# Patient Record
Sex: FEMALE | Race: WHITE | NOT HISPANIC OR LATINO | Employment: OTHER | URBAN - METROPOLITAN AREA
[De-identification: names, ages, dates, MRNs, and addresses within clinical notes are randomized per-mention and may not be internally consistent; named-entity substitution may affect disease eponyms.]

---

## 2017-04-11 ENCOUNTER — TRANSFERRED RECORDS (OUTPATIENT)
Dept: HEALTH INFORMATION MANAGEMENT | Facility: CLINIC | Age: 62
End: 2017-04-11

## 2017-04-14 ENCOUNTER — TRANSFERRED RECORDS (OUTPATIENT)
Dept: HEALTH INFORMATION MANAGEMENT | Facility: CLINIC | Age: 62
End: 2017-04-14

## 2017-06-16 DIAGNOSIS — R31.29 MICROSCOPIC HEMATURIA: Primary | ICD-10-CM

## 2017-06-19 ENCOUNTER — OFFICE VISIT (OUTPATIENT)
Dept: UROLOGY | Facility: CLINIC | Age: 62
End: 2017-06-19
Payer: COMMERCIAL

## 2017-06-19 VITALS — WEIGHT: 133 LBS | HEIGHT: 67 IN | BODY MASS INDEX: 20.88 KG/M2

## 2017-06-19 DIAGNOSIS — R31.29 MICROSCOPIC HEMATURIA: ICD-10-CM

## 2017-06-19 LAB
ALBUMIN UR-MCNC: NEGATIVE MG/DL
APPEARANCE UR: CLEAR
BILIRUB UR QL STRIP: NEGATIVE
COLOR UR AUTO: YELLOW
GLUCOSE UR STRIP-MCNC: NEGATIVE MG/DL
HGB UR QL STRIP: ABNORMAL
KETONES UR STRIP-MCNC: NEGATIVE MG/DL
LEUKOCYTE ESTERASE UR QL STRIP: NEGATIVE
NITRATE UR QL: NEGATIVE
PH UR STRIP: 5.5 PH (ref 5–7)
SP GR UR STRIP: 1.02 (ref 1–1.03)
URN SPEC COLLECT METH UR: ABNORMAL
UROBILINOGEN UR STRIP-ACNC: 0.2 EU/DL (ref 0.2–1)

## 2017-06-19 PROCEDURE — 81003 URINALYSIS AUTO W/O SCOPE: CPT | Performed by: UROLOGY

## 2017-06-19 PROCEDURE — 99203 OFFICE O/P NEW LOW 30 MIN: CPT | Performed by: UROLOGY

## 2017-06-19 RX ORDER — ESTRADIOL/NORETHINDRONE ACETATE TRANSDERMAL SYSTEM .05; .25 MG/D; MG/D
PATCH, EXTENDED RELEASE TRANSDERMAL
Refills: 4 | COMMUNITY
Start: 2017-04-05

## 2017-06-19 ASSESSMENT — PAIN SCALES - GENERAL: PAINLEVEL: NO PAIN (0)

## 2017-06-19 NOTE — NURSING NOTE
Chief Complaint   Patient presents with     Hematuria     Patient is here for microhematuria. Patient said that she was told she had stones years ago but isnt sure if she passed them.     Michael Kovacs LPN 2:54 PM June 19, 2017

## 2017-06-19 NOTE — PROGRESS NOTES
History: This very pleasant 62-year-old lady is seeing me today because of reported microscopic hematuria although on closer questioning he thinks he may have had some red discoloration of the urine about a year ago.  She has had no pain, no history of urinary tract infection and no other remarkable symptoms.  She was investigated for microscopic hematuria about 8 years ago with a possibility of a stone in the left ureter but no other measurements were taken at that time.  She has no pain at this time.  She is otherwise in good health.  She had a history of smoking    Past Medical History:   Diagnosis Date     Breast mass WEEK AGO    LEFT      Complication of anesthesia     nausea     Migraine      Mumps     as a child     Need for prophylactic hormone replacement therapy (postmenopausal) STARTED 4 YRS AGO     Spider veins        Past Surgical History:   Procedure Laterality Date     COLONOSCOPY      hx colonic polyps     COLONOSCOPY  1/17/2013    Procedure: COLONOSCOPY;  COLONOSCOPY;  Surgeon: Segun Boyce MD;  Location:  GI     CYSTOSCOPY  2011     TONSILLECTOMY         Family History   Problem Relation Age of Onset     CANCER Mother      questionable stomach?     CANCER Father      brain cancer       Social History     Social History     Marital status:      Spouse name: N/A     Number of children: N/A     Years of education: N/A     Occupational History     Not on file.     Social History Main Topics     Smoking status: Current Every Day Smoker     Packs/day: 0.20     Types: Cigarettes     Smokeless tobacco: Not on file     Alcohol use Yes      Comment: 1/month     Drug use: Not on file     Sexual activity: Not on file     Other Topics Concern     Not on file     Social History Narrative       Current Outpatient Prescriptions   Medication Sig Dispense Refill     COMBIPATCH 0.05-0.25 MG/DAY BIW patch APPLY 1 PATCH TO ABDOMEN TWICE WEEKLY  4     Aspirin-Acetaminophen-Caffeine (EXCEDRIN MIGRAINE  "PO) Take  by mouth.       Acetaminophen (TYLENOL PO) Take  by mouth.         Review Of Systems:  Skin: negative  Eyes: negative  Ears/Nose/Throat: negative  Respiratory: Smoking history  Cardiovascular: negative  Gastrointestinal: negative  Genitourinary: hematuria  Musculoskeletal: negative  Neurologic: negative  Psychiatric: negative  Hematologic/Lymphatic/Immunologic: negative  Endocrine: negative    Exam:  Ht 1.689 m (5' 6.5\")  Wt 60.3 kg (133 lb)  Breastfeeding? No  BMI 21.15 kg/m2    General Impression: Very pleasant lady in no acute distress, well-oriented in time place and person.    Mental status.  Normal    HEENT: There is no evidence of jaundice and mucous membranes are normal    Skin: Skin is normal to examination    Lymph Nodes: Not examined    Respiratory System: The respiratory cycle is normal    Cardiovascular: Not examined    Abdominal: Not examined    Extremities: Not examined    Back and Flank: There is no flank tenderness    Genital: Not examined    Rectal: Not examined    Neurologic:  There are no focal abnormal neurologic signs and central, or peripheralsystems    Impression: The patient has a history of microscopic hematuria possibly gross hematuria on one occasion in the past.  This was investigated in 2009 with the only possible finding being a 3 mm stone in the left distal ureter.  Surgery was not performed at that time.  She is otherwise in good health.  She does have a significant smoking history however.  I would recommend we do a CT scan of the abdomen and pelvis with and without contrast.  We will also arrange for cystoscopy.  I did discuss the entire situation very carefully with the patient today.  I answered all her questions    Plan: Cystoscopy.  CT scan abdomen and pelvis with and without contrast, urine cytology    Time: 30 minutes.  Greater than 50% spent in discussion and consultation    \"This dictation was performed with voice recognition software and may contain errors,  " "omissions and inadvertent word substitution.\"    "

## 2017-06-19 NOTE — MR AVS SNAPSHOT
After Visit Summary   6/19/2017    Kristine Alvarado    MRN: [id number]           Patient Information     Date Of Birth          1955        Visit Information        Provider Department      6/19/2017 2:40 PM Luis Armando Goldstein MD [facility] Urology [facility]        Today's Diagnoses     Microscopic hematuria           Follow-ups after your visit        Follow-up notes from your care team     Return for cystoscopy, SEE ME AFTER, CT Abdo/Pelvis with/without, cytology.      Your next 10 appointments already scheduled     Jul 10, 2017  9:00 AM CDT   Cystoscopy with Luis Armando Goldstein MD,  CYF   [facility]rology [facility] ([facility])    [address]  [address]  [facility] [address] [phone]              Future tests that were ordered for you today     Open Future Orders        Priority Expected Expires Ordered    CT Abdomen Pelvis w/o & w Contrast [MIB700] Routine  6/19/2018 6/19/2017            Who to contact     If you have questions or need follow up information about today's clinic visit or your schedule please contact [facility] UROLOGY [facility] directly at [phone].  Normal or non-critical lab and imaging results will be communicated to you by MyChart, letter or phone within 4 business days after the clinic has received the results. If you do not hear from us within 7 days, please contact the clinic through [employer] or phone. If you have a critical or abnormal lab result, we will notify you by phone as soon as possible.  Submit refill requests through [employer] or call your pharmacy and they will forward the refill request to us. Please allow 3 business days for your refill to be completed.          Additional Information About Your Visit        MyChart Information     [employer] lets you send messages to your doctor, view your test results, renew your prescriptions, schedule appointments and  "more. To sign up, go to [email or web address] . Click on \"Log in\" on the left side of the screen, which will take you to the Welcome page. Then click on \"Sign up Now\" on the right side of the page.     You will be asked to enter the access code listed below, as well as some personal information. Please follow the directions to create your username and password.     Your access code is: [email or web address]  Expires: 2017  3:11 PM     Your access code will  in 90 days. If you need help or a new code, please call your [place] clinic or [phone].        Care EveryWhere ID     This is your Care EveryWhere ID. This could be used by other organizations to access your [place] medical records  [id number]        Your Vitals Were     Height Breastfeeding? BMI (Body Mass Index)             1.689 m (5' 6.5\") No 21.15 kg/m2          Blood Pressure from Last 3 Encounters:   13 109/77    Weight from Last 3 Encounters:   17 60.3 kg (133 lb)   13 65.8 kg (145 lb)              We Performed the Following     UA without Microscopic [[id number]]        Primary Care Provider Office Phone # Fax #    [facility] [phone]       [address] [place] [address], [address]  [facility] [address]        Thank you!     Thank you for choosing [facility]OLOG[facility] [place]  for your care. Our goal is always to provide you with excellent care. Hearing back from our patients is one way we can continue to improve our services. Please take a few minutes to complete the written survey that you may receive in the mail after your visit with us. Thank you!             Your Updated Medication List - Protect others around you: Learn how to safely use, store and throw away your medicines at www.disposemymeds.org.          This list is accurate as of: 17  3:21 PM.  Always use your most recent med list.                   Brand Name Dispense Instructions for use    COMBIPATCH " 0.05-0.25 MG/DAY BIW patch   Generic drug:  estradiol-norethindrone      APPLY 1 PATCH TO ABDOMEN TWICE WEEKLY       EXCEDRIN MIGRAINE PO      Take  by mouth.       TYLENOL PO      Take  by mouth.

## 2017-06-19 NOTE — LETTER
6/19/2017       RE: Kristine Alvarado  [address] [place] [address]     Dear Colleague,    Thank you for referring your patient, Kristine Alvarado, to the [facility] [place] [facility]. Please see a copy of my visit note below.    History: This very pleasant 62-year-old lady is seeing me today because of reported microscopic hematuria although on closer questioning he thinks he may have had some red discoloration of the urine about a year ago.  She has had no pain, no history of urinary tract infection and no other remarkable symptoms.  She was investigated for microscopic hematuria about 8 years ago with a possibility of a stone in the left ureter but no other measurements were taken at that time.  She has no pain at this time.  She is otherwise in good health.  She had a history of smoking    Past Medical History:   Diagnosis Date     Breast mass WEEK AGO    LEFT      Complication of anesthesia     nausea     Migraine      Mumps     as a child     Need for prophylactic hormone replacement therapy (postmenopausal) STARTED 4 YRS AGO     Spider veins        Past Surgical History:   Procedure Laterality Date     COLONOSCOPY      hx colonic polyps     COLONOSCOPY  1/17/2013    Procedure: COLONOSCOPY;  COLONOSCOPY;  Surgeon: Segun Boyce MD;  Location:  GI     CYSTOSCOPY  2011     TONSILLECTOMY         Family History   Problem Relation Age of Onset     CANCER Mother      questionable stomach?     CANCER Father      brain cancer       Social History     Social History     Marital status:      Spouse name: N/A     Number of children: N/A     Years of education: N/A     Occupational History     Not on file.     Social History Main Topics     Smoking status: Current Every Day Smoker     Packs/day: 0.20     Types: Cigarettes     Smokeless tobacco: Not on file     Alcohol use Yes      Comment: 1/month     Drug use: Not on file      "Sexual activity: Not on file     Other Topics Concern     Not on file     Social History Narrative       Current Outpatient Prescriptions   Medication Sig Dispense Refill     COMBIPATCH 0.05-0.25 MG/DAY BIW patch APPLY 1 PATCH TO ABDOMEN TWICE WEEKLY  4     Aspirin-Acetaminophen-Caffeine (EXCEDRIN MIGRAINE PO) Take  by mouth.       Acetaminophen (TYLENOL PO) Take  by mouth.         Review Of Systems:  Skin: negative  Eyes: negative  Ears/Nose/Throat: negative  Respiratory: Smoking history  Cardiovascular: negative  Gastrointestinal: negative  Genitourinary: hematuria  Musculoskeletal: negative  Neurologic: negative  Psychiatric: negative  Hematologic/Lymphatic/Immunologic: negative  Endocrine: negative    Exam:  Ht 1.689 m (5' 6.5\")  Wt 60.3 kg (133 lb)  Breastfeeding? No  BMI 21.15 kg/m2    General Impression: Very pleasant lady in no acute distress, well-oriented in time place and person.    Mental status.  Normal    HEENT: There is no evidence of jaundice and mucous membranes are normal    Skin: Skin is normal to examination    Lymph Nodes: Not examined    Respiratory System: The respiratory cycle is normal    Cardiovascular: Not examined    Abdominal: Not examined    Extremities: Not examined    Back and Flank: There is no flank tenderness    Genital: Not examined    Rectal: Not examined    Neurologic:  There are no focal abnormal neurologic signs and central, or peripheralsystems    Impression: The patient has a history of microscopic hematuria possibly gross hematuria on one occasion in the past.  This was investigated in 2009 with the only possible finding being a 3 mm stone in the left distal ureter.  Surgery was not performed at that time.  She is otherwise in good health.  She does have a significant smoking history however.  I would recommend we do a CT scan of the abdomen and pelvis with and without contrast.  We will also arrange for cystoscopy.  I did discuss the entire situation very carefully with " "the patient today.  I answered all her questions    Plan: Cystoscopy.  CT scan abdomen and pelvis with and without contrast, urine cytology    Time: 30 minutes.  Greater than 50% spent in discussion and consultation    \"This dictation was performed with voice recognition software and may contain errors,  omissions and inadvertent word substitution.\"      Again, thank you for allowing me to participate in the care of your patient.      Sincerely,    Luis Armando Goldstein MD      "

## 2017-07-07 DIAGNOSIS — R31.29 MICROHEMATURIA: Primary | ICD-10-CM

## 2017-07-10 ENCOUNTER — OFFICE VISIT (OUTPATIENT)
Dept: UROLOGY | Facility: CLINIC | Age: 62
End: 2017-07-10
Payer: COMMERCIAL

## 2017-07-10 VITALS
BODY MASS INDEX: 20.88 KG/M2 | WEIGHT: 133 LBS | HEART RATE: 80 BPM | DIASTOLIC BLOOD PRESSURE: 66 MMHG | HEIGHT: 67 IN | SYSTOLIC BLOOD PRESSURE: 110 MMHG

## 2017-07-10 DIAGNOSIS — N20.0 CALCULUS OF KIDNEY: Primary | ICD-10-CM

## 2017-07-10 DIAGNOSIS — R31.29 MICROHEMATURIA: ICD-10-CM

## 2017-07-10 LAB
ALBUMIN UR-MCNC: NEGATIVE MG/DL
APPEARANCE UR: CLEAR
BILIRUB UR QL STRIP: NEGATIVE
COLOR UR AUTO: YELLOW
GLUCOSE UR STRIP-MCNC: NEGATIVE MG/DL
HGB UR QL STRIP: ABNORMAL
KETONES UR STRIP-MCNC: NEGATIVE MG/DL
LEUKOCYTE ESTERASE UR QL STRIP: NEGATIVE
NITRATE UR QL: NEGATIVE
PH UR STRIP: 5.5 PH (ref 5–7)
SP GR UR STRIP: <=1.005 (ref 1–1.03)
URN SPEC COLLECT METH UR: ABNORMAL
UROBILINOGEN UR STRIP-ACNC: 0.2 EU/DL (ref 0.2–1)

## 2017-07-10 PROCEDURE — 81003 URINALYSIS AUTO W/O SCOPE: CPT | Performed by: UROLOGY

## 2017-07-10 PROCEDURE — 52000 CYSTOURETHROSCOPY: CPT | Performed by: UROLOGY

## 2017-07-10 PROCEDURE — 99213 OFFICE O/P EST LOW 20 MIN: CPT | Mod: 25 | Performed by: UROLOGY

## 2017-07-10 RX ORDER — CIPROFLOXACIN 500 MG/1
500 TABLET, FILM COATED ORAL 2 TIMES DAILY
Qty: 1 TABLET | Refills: 0 | Status: ON HOLD | OUTPATIENT
Start: 2017-07-10 | End: 2018-07-31

## 2017-07-10 ASSESSMENT — PAIN SCALES - GENERAL: PAINLEVEL: NO PAIN (0)

## 2017-07-10 NOTE — NURSING NOTE
Chief Complaint   Patient presents with     Cystoscopy     Microhematuria     Prior to the start of the procedure and with procedural staff participation, I verbally confirmed the patient s identity using two indicators, relevant allergies, that the procedure was appropriate and matched the consent or emergent situation, and that the correct equipment/implants were available. Immediately prior to starting the procedure I conducted the Time Out with the procedural staff and re-confirmed the patient s name, procedure, and site/side. (The Joint Commission universal protocol was followed.)  Yes    Sedation (Moderate or Deep): None    Elsa Rodriguez LPN

## 2017-07-10 NOTE — MR AVS SNAPSHOT
After Visit Summary   7/10/2017    Kristine Alvarado    MRN: [id number]           Patient Information     Date Of Birth          1955        Visit Information        Provider Department      7/10/2017 9:00 AM Luis Armando Goldstein MD;  [facility] [place]        Today's Diagnoses     Calculus of kidney    -  1    Microhematuria           Follow-ups after your visit        Follow-up notes from your care team     Return in about 6 months (around 1/10/2018) for KUB, Physical Exam.      Your next 10 appointments already scheduled     Feb 20, 2018 12:30 PM CST   XR KUB with SHXR3   [facility] Radiology ([facility])    [address] [facility] [id number] [phone]           Please bring a list of your current medicines to your exam. (Include vitamins, minerals and over-thecounter medicines.) Leave your valuables at home.  Tell your doctor if there is a chance you may be pregnant.  You do not need to do anything special for this exam.            Feb 20, 2018  1:30 PM CST   Return Visit with Luis Armando Goldstein MD   [facility] Urology [facility] ([facility] [place])    [address]  [address]  [facility] [id number] [phone]              Future tests that were ordered for you today     Open Future Orders        Priority Expected Expires Ordered    XR KUB [[id number]] Routine 7/10/2017 7/10/2018 7/10/2017            Who to contact     If you have questions or need follow up information about today's clinic visit or your schedule please contact [facility] UROLOGY [facility] directly at [phone].  Normal or non-critical lab and imaging results will be communicated to you by MyChart, letter or phone within 4 business days after the clinic has received the results. If you do not hear from us within 7 days, please contact the clinic through MyChart or phone. If you have a  "critical or abnormal lab result, we will notify you by phone as soon as possible.  Submit refill requests through "[employer]" or call your pharmacy and they will forward the refill request to us. Please allow 3 business days for your refill to be completed.          Additional Information About Your Visit        [employer] Information     "[employer] lets you send messages to your doctor, view your test results, renew your prescriptions, schedule appointments and more. To sign up, go to [email or web address]" . Click on \"Log in\" on the left side of the screen, which will take you to the Welcome page. Then click on \"Sign up Now\" on the right side of the page.     You will be asked to enter the access code listed below, as well as some personal information. Please follow the directions to create your username and password.     Your access code is: [email or web address]  Expires: 2017  3:11 PM     Your access code will  in 90 days. If you need help or a new code, please call your [place] clinic or [phone].        Care EveryWhere ID     This is your Care EveryWhere ID. This could be used by other organizations to access your [place] medical records  [id number]        Your Vitals Were     Pulse Height BMI (Body Mass Index)             80 1.689 m (5' 6.5\") 21.15 kg/m2          Blood Pressure from Last 3 Encounters:   07/10/17 110/66   13 109/77    Weight from Last 3 Encounters:   07/10/17 60.3 kg (133 lb)   17 60.3 kg (133 lb)   13 65.8 kg (145 lb)              We Performed the Following     UA without Microscopic          Today's Medication Changes          These changes are accurate as of: 7/10/17  9:51 AM.  If you have any questions, ask your nurse or doctor.               Start taking these medicines.        Dose/Directions    ciprofloxacin 500 MG tablet   Commonly known as:  CIPRO   Used for:  Calculus of kidney, Microhematuria   Started by:  Luis Armando Goldstein MD        Dose:  500 mg   Take 1 " tablet (500 mg) by mouth 2 times daily   Quantity:  1 tablet   Refills:  0            Where to get your medicines      These medications were sent to [facility] [place] - [place], [place] - [address]  [address], [place] [address]     Phone:  [phone]     ciprofloxacin 500 MG tablet                Primary Care Provider Office Phone # Fax #    [facility] [phone]       [facility], [address]  [place] [address]        Equal Access to Services     [facility]: Hadii aad ku hadasho Soomaali, waaxda luqadaha, qaybta kaalmada adeegyada, waxay layne toro . So [facility] [phone].    ATENCIÓN: Si habla español, tiene a albarran disposición servicios gratuitos de asistencia lingüística. Llame al [phone].    We comply with applicable federal civil rights laws and [place] laws. We do not discriminate on the basis of race, color, national origin, age, disability sex, sexual orientation or gender identity.            Thank you!     Thank you for choosing [facility] UROLOGY CLINIC [place]  for your care. Our goal is always to provide you with excellent care. Hearing back from our patients is one way we can continue to improve our services. Please take a few minutes to complete the written survey that you may receive in the mail after your visit with us. Thank you!             Your Updated Medication List - Protect others around you: Learn how to safely use, store and throw away your medicines at www.disposemymeds.org.          This list is accurate as of: 7/10/17  9:51 AM.  Always use your most recent med list.                   Brand Name Dispense Instructions for use Diagnosis    ciprofloxacin 500 MG tablet    CIPRO    1 tablet    Take 1 tablet (500 mg) by mouth 2 times daily    Calculus of kidney, Microhematuria       COMBIPATCH 0.05-0.25 MG/DAY BIW patch   Generic drug:  estradiol-norethindrone      APPLY  1 PATCH TO ABDOMEN TWICE WEEKLY        EXCEDRIN MIGRAINE PO      Take  by mouth.        TYLENOL PO      Take  by mouth.

## 2017-07-10 NOTE — PROGRESS NOTES
This very pleasant 62-year-old lady returns today for cystoscopy..  We recall that she had recently been found to have microscopic hematuria with no other significant symptoms.  The CT scan, which I personally reviewed today does show a 1 cm calculus in the lower pole calyx of the left kidney and a 2 mm stone in the lower calyx of the right kidney.  There is no evidence of hydronephrosis or significant renal masses.  There are no other remarkable features of note other than what appears to be a fibroid uterus.    Procedure.  Cystoscopy.  Surgeon.. Angelita.  Anesthesia.  Local anesthesia.  Description.  With the patient in the dorsal lithotomy position, and with the genital area prepped and draped in the customary fashion, a flexible cystoscope was carefully passed into the urethra.  The urethra is unremarkable.  The interior the bladder is carefully inspected.  There is no evidence of neoplasm or stone in the bladder.  There is no evidence of trabeculation.  The ureteral openings are in normal position and there are no other remarkable features.    Impression.  I have reviewed and discussed the entire situation very carefully with the patient today.  I suspect that the microscopic hematuria may well be caused by the 1 cm stone in the left kidney.  This is not causing any significant symptoms of clinical nature at this time.  I reviewed the KUB from 2010 and note that there was no evidence of a stone that could be seen on the KUB at that time so either the stone has grown in the last 7 years, orifices appear uric acid stone.  I did discuss potential treatment options with the patient including conservative management but with repeating the KUB in about 6 months to see if the stone A, can be seen, and B, to see if it actually got larger.  I have advised her that the stone could conceivably movement causes severe symptoms in which case we would need to see her promptly or she would need to go straight to the emergency  "room.  We did talk about the potential treatment options and it would be likely that ESWL would be a primary treatment option for stone this size although the stone dropped into the ureter with may need to consider ureteroscopy with treatment with holmium laser.  I did discuss the entire situation with her in detail today.  We have decided that we will repeat the KUB in 6 months time to determine if there is a growth rate.  If there are severe symptoms prior to that timeout procedure immediately.  I also her questions.    Plan.  6 months for KUB and examination.  Time.  15 minutes was spent in addition to the cystoscopy in order to discuss the findings, the globe the CT scan, to talk about the management of kidney stones and to answer any pertinent questions related to this situation.    \"This dictation was performed with voice recognition software and may contain errors,  omissions and inadvertent word substitution.\"      "

## 2017-07-10 NOTE — LETTER
7/10/2017       RE: Kristine Alvarado  [address] [facility] [address]     Dear Colleague,    Thank you for referring your patient, Kristine Alvarado, to the [facility] [place] at [facility]. Please see a copy of my visit note below.    This very pleasant 62-year-old lady returns today for cystoscopy..  We recall that she had recently been found to have microscopic hematuria with no other significant symptoms.  The CT scan, which I personally reviewed today does show a 1 cm calculus in the lower pole calyx of the left kidney and a 2 mm stone in the lower calyx of the right kidney.  There is no evidence of hydronephrosis or significant renal masses.  There are no other remarkable features of note other than what appears to be a fibroid uterus.    Procedure.  Cystoscopy.  Surgeon.. Angelita.  Anesthesia.  Local anesthesia.  Description.  With the patient in the dorsal lithotomy position, and with the genital area prepped and draped in the customary fashion, a flexible cystoscope was carefully passed into the urethra.  The urethra is unremarkable.  The interior the bladder is carefully inspected.  There is no evidence of neoplasm or stone in the bladder.  There is no evidence of trabeculation.  The ureteral openings are in normal position and there are no other remarkable features.    Impression.  I have reviewed and discussed the entire situation very carefully with the patient today.  I suspect that the microscopic hematuria may well be caused by the 1 cm stone in the left kidney.  This is not causing any significant symptoms of clinical nature at this time.  I reviewed the KUB from 2010 and note that there was no evidence of a stone that could be seen on the KUB at that time so either the stone has grown in the last 7 years, orifices appear uric acid stone.  I did discuss potential treatment options with the patient including conservative  "management but with repeating the KUB in about 6 months to see if the stone A, can be seen, and B, to see if it actually got larger.  I have advised her that the stone could conceivably movement causes severe symptoms in which case we would need to see her promptly or she would need to go straight to the emergency room.  We did talk about the potential treatment options and it would be likely that ESWL would be a primary treatment option for stone this size although the stone dropped into the ureter with may need to consider ureteroscopy with treatment with holmium laser.  I did discuss the entire situation with her in detail today.  We have decided that we will repeat the KUB in 6 months time to determine if there is a growth rate.  If there are severe symptoms prior to that timeout procedure immediately.  I also her questions.    Plan.  6 months for KUB and examination.  Time.  15 minutes was spent in addition to the cystoscopy in order to discuss the findings, the globe the CT scan, to talk about the management of kidney stones and to answer any pertinent questions related to this situation.    \"This dictation was performed with voice recognition software and may contain errors,  omissions and inadvertent word substitution.\"    Again, thank you for allowing me to participate in the care of your patient.      Sincerely,    Luis Armando Goldstein MD      "

## 2017-09-16 ENCOUNTER — HEALTH MAINTENANCE LETTER (OUTPATIENT)
Age: 62
End: 2017-09-16

## 2017-09-27 ENCOUNTER — HOSPITAL ENCOUNTER (OUTPATIENT)
Dept: MAMMOGRAPHY | Facility: CLINIC | Age: 62
Discharge: HOME | End: 2017-09-27
Attending: OBSTETRICS & GYNECOLOGY | Admitting: OBSTETRICS & GYNECOLOGY
Payer: COMMERCIAL

## 2017-09-27 DIAGNOSIS — Z12.31 ENCOUNTER FOR SCREENING MAMMOGRAM FOR HIGH-RISK PATIENT: ICD-10-CM

## 2017-09-27 PROCEDURE — G0202 SCR MAMMO BI INCL CAD: HCPCS

## 2017-09-27 PROCEDURE — 77063 BREAST TOMOSYNTHESIS BI: CPT

## 2018-07-31 ENCOUNTER — HOSPITAL ENCOUNTER (OUTPATIENT)
Facility: CLINIC | Age: 63
Discharge: HOME | End: 2018-07-31
Attending: COLON & RECTAL SURGERY | Admitting: COLON & RECTAL SURGERY
Payer: COMMERCIAL

## 2018-07-31 ENCOUNTER — SURGERY (OUTPATIENT)
Age: 63
End: 2018-07-31

## 2018-07-31 VITALS
WEIGHT: 140 LBS | RESPIRATION RATE: 24 BRPM | HEIGHT: 66 IN | DIASTOLIC BLOOD PRESSURE: 69 MMHG | BODY MASS INDEX: 22.5 KG/M2 | OXYGEN SATURATION: 96 % | SYSTOLIC BLOOD PRESSURE: 102 MMHG

## 2018-07-31 LAB — COLONOSCOPY: NORMAL

## 2018-07-31 PROCEDURE — 45378 DIAGNOSTIC COLONOSCOPY: CPT | Performed by: COLON & RECTAL SURGERY

## 2018-07-31 PROCEDURE — G0500 MOD SEDAT ENDO SERVICE >5YRS: HCPCS | Performed by: COLON & RECTAL SURGERY

## 2018-07-31 PROCEDURE — 25000128 H RX IP 250 OP 636: Performed by: COLON & RECTAL SURGERY

## 2018-07-31 PROCEDURE — G0121 COLON CA SCRN NOT HI RSK IND: HCPCS | Performed by: COLON & RECTAL SURGERY

## 2018-07-31 RX ORDER — ONDANSETRON 4 MG/1
4 TABLET, ORALLY DISINTEGRATING ORAL EVERY 6 HOURS PRN
Status: CANCELLED | OUTPATIENT
Start: 2018-07-31

## 2018-07-31 RX ORDER — ONDANSETRON 2 MG/ML
4 INJECTION INTRAMUSCULAR; INTRAVENOUS EVERY 6 HOURS PRN
Status: CANCELLED | OUTPATIENT
Start: 2018-07-31

## 2018-07-31 RX ORDER — NALOXONE HYDROCHLORIDE 0.4 MG/ML
.1-.4 INJECTION, SOLUTION INTRAMUSCULAR; INTRAVENOUS; SUBCUTANEOUS
Status: CANCELLED | OUTPATIENT
Start: 2018-07-31 | End: 2018-08-01

## 2018-07-31 RX ORDER — ONDANSETRON 2 MG/ML
4 INJECTION INTRAMUSCULAR; INTRAVENOUS
Status: DISCONTINUED | OUTPATIENT
Start: 2018-07-31 | End: 2018-07-31 | Stop reason: HOSPADM

## 2018-07-31 RX ORDER — FLUMAZENIL 0.1 MG/ML
0.2 INJECTION, SOLUTION INTRAVENOUS
Status: CANCELLED | OUTPATIENT
Start: 2018-07-31 | End: 2018-08-01

## 2018-07-31 RX ORDER — LIDOCAINE 40 MG/G
CREAM TOPICAL
Status: DISCONTINUED | OUTPATIENT
Start: 2018-07-31 | End: 2018-07-31 | Stop reason: HOSPADM

## 2018-07-31 RX ORDER — FENTANYL CITRATE 50 UG/ML
INJECTION, SOLUTION INTRAMUSCULAR; INTRAVENOUS PRN
Status: DISCONTINUED | OUTPATIENT
Start: 2018-07-31 | End: 2018-07-31 | Stop reason: HOSPADM

## 2018-07-31 RX ADMIN — FENTANYL CITRATE 100 MCG: 50 INJECTION, SOLUTION INTRAMUSCULAR; INTRAVENOUS at 14:12

## 2018-07-31 RX ADMIN — MIDAZOLAM 2 MG: 1 INJECTION INTRAMUSCULAR; INTRAVENOUS at 14:13

## 2018-07-31 NOTE — H&P
[facility]    History and Physical  Colon and Rectal Surgery     Date of Admission:  7/31/2018      Assessment & Plan   Kristine Alvarado is a 63 year old female who presents for colonoscopy.    Indication: history of colon polyps  Plan for Colonoscopy with possible biopsy, possible polypectomy. We discussed the risks, benefits and alternatives and the patient wished to proceed.    The above has been forwarded to the consulting provider.      Segun Boyce MD  [facility]  [phone]        Code Status   Full Code    Primary Care Physician   Mima Buckner      History is obtained from the patient    History of Present Illness   Kristine Alvarado is a 63 year old female who presents with a history of colon polyps    Past Medical History    I have reviewed this patient's medical history and updated it with pertinent information if needed.   Past Medical History:   Diagnosis Date     Breast mass WEEK AGO    LEFT      Complication of anesthesia     nausea     Migraine      Mumps     as a child     Need for prophylactic hormone replacement therapy (postmenopausal) STARTED 4 YRS AGO     Spider veins        Past Surgical History   I have reviewed this patient's surgical history and updated it with pertinent information if needed.  Past Surgical History:   Procedure Laterality Date     COLONOSCOPY      hx colonic polyps     COLONOSCOPY  1/17/2013    Procedure: COLONOSCOPY;  COLONOSCOPY;  Surgeon: Segun Boyce MD;  Location:  GI     CYSTOSCOPY  2011     TONSILLECTOMY         Prior to Admission Medications   Prior to Admission Medications   Prescriptions Last Dose Informant Patient Reported? Taking?   Acetaminophen (TYLENOL PO) More than a month  Yes No   Sig: Take  by mouth.   Aspirin-Acetaminophen-Caffeine (EXCEDRIN MIGRAINE PO) Past Week  Yes Yes   Sig: Take  by mouth.   COMBIPATCH 0.05-0.25 MG/DAY BIW patch 7/31/2018  Yes Yes   Sig: APPLY 1 PATCH TO  ABDOMEN TWICE WEEKLY   Lactobacillus (PROBIOTIC ACIDOPHILUS PO) Past Week  Yes Yes      Facility-Administered Medications: None     Allergies   Allergies   Allergen Reactions     Sulfa Drugs Rash       Social History   I have reviewed this patient's social history and updated it with pertinent information if needed. Kristine Alvarado  reports that she has been smoking Cigarettes.  She has been smoking about 0.20 packs per day. She has never used smokeless tobacco. She reports that she drinks alcohol.    Family History   I have reviewed this patient's family history and updated it with pertinent information if needed.   Family History   Problem Relation Age of Onset     Cancer Mother      questionable stomach?     Cancer Father      brain cancer     Breast Cancer Sister        Review of Systems   CONSTITUTIONAL: NEGATIVE for fever, chills, change in weight  ENT/MOUTH: NEGATIVE for ear, mouth and throat problems  RESP: NEGATIVE for significant cough or SOB  CV: NEGATIVE for chest pain, palpitations or peripheral edema    Physical Exam       BP: 107/80     Resp: 16 SpO2: 99 %      Vital Signs with Ranges  Resp:  [16] 16  BP: (107)/(80) 107/80  SpO2:  [99 %] 99 %  140 lbs 0 oz    Constitutional: awake, alert, cooperative, no apparent distress, and appears stated age  AIRWAY EXAM: Mallampatti Class I (visualization of the soft palate, fauces, uvula, anterior and posterior pillars)  Respiratory: No increased work of breathing, good air exchange, clear to auscultation bilaterally, no crackles or wheezing  Cardiovascular: Normal apical impulse, regular rate and rhythm, normal S1 and S2, no S3 or S4, and no murmur noted  ASA Class: 1 - Healthy patient, no medical problems

## 2018-07-31 NOTE — BRIEF OP NOTE
[facility] Brief Operative Note    Pre-operative diagnosis: HISTORY OF POLYPS   Post-operative diagnosis normal colonoscopy   Procedure: Procedure(s):  COLONOSCOPY - Wound Class: II-Clean Contaminated   Surgeon(s): Surgeon(s) and Role:     * Segun Boyce MD - Primary   Estimated blood loss: * No values recorded between 7/31/2018 12:00 AM and 7/31/2018  2:35 PM *    Specimens: * No specimens in log *   Findings: normal colonoscopy  See provation procedure note in chart review.    Segun Boyce MD  [facility]  [phone]

## 2018-08-02 ENCOUNTER — THERAPY VISIT (OUTPATIENT)
Dept: PHYSICAL THERAPY | Facility: CLINIC | Age: 63
End: 2018-08-02
Payer: COMMERCIAL

## 2018-08-02 DIAGNOSIS — M54.2 NECK PAIN: ICD-10-CM

## 2018-08-02 DIAGNOSIS — M25.511 RIGHT SHOULDER PAIN: Primary | ICD-10-CM

## 2018-08-02 PROCEDURE — 97112 NEUROMUSCULAR REEDUCATION: CPT | Mod: GP | Performed by: PHYSICAL THERAPIST

## 2018-08-02 PROCEDURE — 97161 PT EVAL LOW COMPLEX 20 MIN: CPT | Mod: GP | Performed by: PHYSICAL THERAPIST

## 2018-08-02 PROCEDURE — 97110 THERAPEUTIC EXERCISES: CPT | Mod: GP | Performed by: PHYSICAL THERAPIST

## 2018-08-02 NOTE — LETTER
[facility]  [address] [place] [address]  [address]  [facility] [id number] [phone]    August 3, 2018    Re: Kristine Alvarado   :   1955  MRN:  [id number]   REFERRING PHYSICIAN:   Mima Buckner    [facility]    Date of Initial Evaluation:  18  Visits:  Rxs Used: 1  Reason for Referral:     Right shoulder pain  Neck pain    EVALUATION SUMMARY    [facility] Initial Evaluation  Subjective:  Kristine Alvarado is a 63 year old female with a right shoulder condition. This is a new condition  Pt reports R shoulder RC since , pt tripped and she tried to catch herself when she started reporting pain in her shoulder. she had PT for it which helped a little, pt lived in [place] back then, she yajaira back to [place] when she had an MRI done for a neck and shoulder; MRI showed RC tear 75%, cortisone shot was given which helped a lot, she went back to [place] where she did pilates which helped significantly, MD recommended PT on 18      Patient reports pain:  In the joint and lateral.  Radiates to:  Cervical, upper arm and lower arm.  Pain is described as sharp and is constant and reported as 8/10.  Associated symptoms:  Loss of strength. Pain is worse during the day (wakes up once a night ).  Symptoms are exacerbated by lifting, lying on extremity, using arm behind back and using arm overhead and relieved by NSAID's.  Since onset symptoms are gradually improving.  Special tests:  MRI. General health as reported by patient is good.  Pertinent medical history includes:  Migraines/headaches, concussions/dizziness, and smoking (Pain at night/rest, numbness/tingling). Current medications:  Hormone replacement therapy.  Current occupation is Retried.  Primary job tasks include:  Prolonged sitting, lifting and driving (Computer work).  Medical allergies: yes (Sulfa).  Surgical  history: Sinus.             Objective:  Standing Alignment:    Shoulder/UE:  Rounded shoulders and scapular winging R              Re: Kristine Alvarado   :   1955        Cervical/Thoracic Evaluation  AROM:  AROM Cervical:  Flexion:          End range stretch   Extension:       80% ROM  Rotation:         Left: 70%      Right: 60%  Side Bend:      Left:     Right:   Headaches: migraine     Shoulder Evaluation:  ROM:  AROM:    Flexion:  Left:  170    Right:  135  Extension: Left: 60Right: 35  Abduction:  Left: 160   Right:  118  Internal Rotation:  Left:  T4    Right:  L 4  Horizontal Adduction:  Left:  Wnl    Right:  Max rest   Strength:    Flexion: Left:4+/5   Pain:    Right: 2+/5     Pain:   Extension:  Left: 5-/5    Pain:    Right: 2+/5    Pain:  Abduction:  Left: 4+/5  Pain:    Right: 2+/5      Pain:+  Internal Rotation:  Left:5/5     Pain:    Right: 4+/5     Pain:  External Rotation:   Left:5/5     Pain:   Right:3-/5     Pain:    Special Tests:    Right shoulder negative for the following special tests:Rotator cuff tear  Palpation:    Right shoulder tenderness present at: Supraspinatus; Infraspinatus; Teres Minor and Bicipital Groove   General   ROS    Assessment/Plan:    Patient is a 63 year old female with cervical and right side shoulder complaints.    Patient has the following significant findings with corresponding treatment plan.                Diagnosis 1:  Neck pain, R shoulder RC tear Pain -  hot/cold therapy, manual therapy, self management, education, directional preference exercise and home program  Decreased ROM/flexibility - manual therapy, therapeutic exercise and home program  Decreased joint mobility - manual therapy, therapeutic exercise and home program  Decreased strength - therapeutic exercise, therapeutic activities and home program  Impaired muscle performance - neuro re-education  Decreased function - therapeutic activities  Impaired posture - neuro re-education    Therapy  Evaluation Codes:   1) History comprised of:   Personal factors that impact the plan of care:      Time since onset of symptoms.    Comorbidity factors that impact the plan of care are:      Dizziness, Migraines/headaches, Numbness/tingling, Pain at night/rest and Smoking.     Medications impacting care: Anti-inflammatory.  Re: Kristine Alvarado   :   1955    2) Examination of Body Systems comprised of:   Body structures and functions that impact the plan of care:      Cervical spine and Shoulder.   Activity limitations that impact the plan of care are:      Dressing, Lifting, Reading/Computer work and Sitting.  3) Clinical presentation characteristics are:   Stable/Uncomplicated.  4) Decision-Making    Moderate complexity using standardized patient assessment instrument and/or measureable assessment of functional outcome.  Cumulative Therapy Evaluation is: Low complexity.    Communication ability:  Patient appears to be able to clearly communicate and understand verbal and written communication and follow directions correctly.  Treatment Explanation - The following has been discussed with the patient:   RX ordered/plan of care  Anticipated outcomes  Possible risks and side effects  This patient would benefit from PT intervention to resume normal activities.   Rehab potential is good.  Frequency:  1 X week, once daily  Duration:  for 8 weeks  Discharge Plan:  Achieve all LTG.  Independent in home treatment program.  Return to previous functional level by discharge.  Reach maximal therapeutic benefit.    Thank you for your referral.    INQUIRIES  Therapist: Kanchan Roque PT   [facility]  [address] [place] [address]  [address]  [facility] [address]  Phone: [phone]  Fax: [phone]

## 2018-08-02 NOTE — PROGRESS NOTES
[place] [facility] Initial Evaluation  Subjective:  Patient is a 63 year old female presenting with rehab right shoulder hpi.   Kristine Alvarado is a 63 year old female with a right shoulder condition.      This is a new condition  Pt reports R shoulder RC since 12/17, pt tripped and she tried to catch herself when she started reporting pain in her shoulder. she had PT for it which helped a little, pt lived in [place] back then, she yajaira back to [place] when she had an MRI done for a neck and shoulder; MRI showed RC tear 75%, cortisone shot was given which helped a lot, she went back to [place] where she did pilates which helped significantly, MD recommended PT on 7/27/18  .    Patient reports pain:  In the joint and lateral.  Radiates to:  Cervical, upper arm and lower arm.  Pain is described as sharp and is constant and reported as 8/10.  Associated symptoms:  Loss of strength. Pain is worse during the day (wakes up once a night ).  Symptoms are exacerbated by lifting, lying on extremity, using arm behind back and using arm overhead and relieved by NSAID's.  Since onset symptoms are gradually improving.  Special tests:  MRI.      General health as reported by patient is good.  Pertinent medical history includes:  Migraines/headaches.      Current medications:  Hormone replacement therapy.  Current occupation is Retried .    Primary job tasks include:  Prolonged sitting.                                Objective:  Standing Alignment:      Shoulder/UE:  Rounded shoulders and scapular winging R                                  Cervical/Thoracic Evaluation    AROM:  AROM Cervical:    Flexion:          End range stretch   Extension:       80% ROM  Rotation:         Left: 70%      Right: 60%  Side Bend:      Left:     Right:       Headaches: migraine                         Shoulder Evaluation:  ROM:  AROM:    Flexion:  Left:  170    Right:  135  Extension: Left: 60Right: 35  Abduction:  Left: 160   Right:   118    Internal Rotation:  Left:  T4    Right:  L 4      Horizontal Adduction:  Left:  Wnl    Right:  Max rest                   Strength:    Flexion: Left:4+/5   Pain:    Right: 2+/5     Pain:   Extension:  Left: 5-/5    Pain:    Right: 2+/5    Pain:  Abduction:  Left: 4+/5  Pain:    Right: 2+/5      Pain:+    Internal Rotation:  Left:5/5     Pain:    Right: 4+/5     Pain:  External Rotation:   Left:5/5     Pain:   Right:3-/5     Pain:              Special Tests:        Right shoulder negative for the following special tests:Rotator cuff tear  Palpation:      Right shoulder tenderness present at: Supraspinatus; Infraspinatus; Teres Minor and Bicipital Groove                                     General     ROS    Assessment/Plan:    Patient is a 63 year old female with cervical and right side shoulder complaints.    Patient has the following significant findings with corresponding treatment plan.                Diagnosis 1:  Neck pain, R shoulder RC tear Pain -  hot/cold therapy, manual therapy, self management, education, directional preference exercise and home program  Decreased ROM/flexibility - manual therapy, therapeutic exercise and home program  Decreased joint mobility - manual therapy, therapeutic exercise and home program  Decreased strength - therapeutic exercise, therapeutic activities and home program  Impaired muscle performance - neuro re-education  Decreased function - therapeutic activities  Impaired posture - neuro re-education    Therapy Evaluation Codes:   1) History comprised of:   Personal factors that impact the plan of care:      Time since onset of symptoms.    Comorbidity factors that impact the plan of care are:      Dizziness, Migraines/headaches, Numbness/tingling, Pain at night/rest and Smoking.     Medications impacting care: Anti-inflammatory.  2) Examination of Body Systems comprised of:   Body structures and functions that impact the plan of care:      Cervical spine and  Shoulder.   Activity limitations that impact the plan of care are:      Dressing, Lifting, Reading/Computer work and Sitting.  3) Clinical presentation characteristics are:   Stable/Uncomplicated.  4) Decision-Making    Moderate complexity using standardized patient assessment instrument and/or measureable assessment of functional outcome.  Cumulative Therapy Evaluation is: Low complexity.    Previous and current functional limitations:  (See Goal Flow Sheet for this information)    Short term and Long term goals: (See Goal Flow Sheet for this information)     Communication ability:  Patient appears to be able to clearly communicate and understand verbal and written communication and follow directions correctly.  Treatment Explanation - The following has been discussed with the patient:   RX ordered/plan of care  Anticipated outcomes  Possible risks and side effects  This patient would benefit from PT intervention to resume normal activities.   Rehab potential is good.    Frequency:  1 X week, once daily  Duration:  for 8 weeks  Discharge Plan:  Achieve all LTG.  Independent in home treatment program.  Return to previous functional level by discharge.  Reach maximal therapeutic benefit.    Please refer to the daily flowsheet for treatment today, total treatment time and time spent performing 1:1 timed codes.

## 2018-08-03 NOTE — PROGRESS NOTES
[place] [facility] Initial Evaluation  Subjective:  Patient is a 63 year old female presenting with rehab right shoulder hpi.                                      Pertinent medical history includes:  Concussions/dizziness, migraines/headaches and smoking (Pain at night/rest, numbness/tingling).  Medical allergies: yes (Sulfa).  Surgical history: Sinus.  Current medications:  Hormone replacement therapy.      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                                Objective:  System    Physical Exam    General     ROS    Assessment/Plan:

## 2018-08-09 ENCOUNTER — THERAPY VISIT (OUTPATIENT)
Dept: PHYSICAL THERAPY | Facility: CLINIC | Age: 63
End: 2018-08-09
Payer: COMMERCIAL

## 2018-08-09 DIAGNOSIS — M25.511 RIGHT SHOULDER PAIN: ICD-10-CM

## 2018-08-09 DIAGNOSIS — M54.2 NECK PAIN: ICD-10-CM

## 2018-08-09 PROCEDURE — 97140 MANUAL THERAPY 1/> REGIONS: CPT | Mod: GP | Performed by: PHYSICAL THERAPIST

## 2018-08-09 PROCEDURE — 97110 THERAPEUTIC EXERCISES: CPT | Mod: GP | Performed by: PHYSICAL THERAPIST

## 2018-08-16 ENCOUNTER — THERAPY VISIT (OUTPATIENT)
Dept: PHYSICAL THERAPY | Facility: CLINIC | Age: 63
End: 2018-08-16
Payer: COMMERCIAL

## 2018-08-16 DIAGNOSIS — M25.511 RIGHT SHOULDER PAIN: ICD-10-CM

## 2018-08-16 DIAGNOSIS — M54.2 NECK PAIN: ICD-10-CM

## 2018-08-16 PROCEDURE — 97112 NEUROMUSCULAR REEDUCATION: CPT | Mod: GP | Performed by: PHYSICAL THERAPIST

## 2018-08-16 PROCEDURE — 97110 THERAPEUTIC EXERCISES: CPT | Mod: GP | Performed by: PHYSICAL THERAPIST

## 2018-08-23 ENCOUNTER — THERAPY VISIT (OUTPATIENT)
Dept: PHYSICAL THERAPY | Facility: CLINIC | Age: 63
End: 2018-08-23
Payer: COMMERCIAL

## 2018-08-23 DIAGNOSIS — M54.2 NECK PAIN: ICD-10-CM

## 2018-08-23 DIAGNOSIS — M25.511 RIGHT SHOULDER PAIN: ICD-10-CM

## 2018-08-23 PROCEDURE — 97110 THERAPEUTIC EXERCISES: CPT | Mod: GP | Performed by: PHYSICAL THERAPIST

## 2018-08-23 PROCEDURE — 97112 NEUROMUSCULAR REEDUCATION: CPT | Mod: GP | Performed by: PHYSICAL THERAPIST

## 2018-09-06 ENCOUNTER — THERAPY VISIT (OUTPATIENT)
Dept: PHYSICAL THERAPY | Facility: CLINIC | Age: 63
End: 2018-09-06
Payer: COMMERCIAL

## 2018-09-06 DIAGNOSIS — M25.511 RIGHT SHOULDER PAIN: ICD-10-CM

## 2018-09-06 DIAGNOSIS — M54.2 NECK PAIN: ICD-10-CM

## 2018-09-06 PROCEDURE — 97112 NEUROMUSCULAR REEDUCATION: CPT | Mod: GP | Performed by: PHYSICAL THERAPIST

## 2018-09-06 PROCEDURE — 97110 THERAPEUTIC EXERCISES: CPT | Mod: GP | Performed by: PHYSICAL THERAPIST

## 2018-09-27 ENCOUNTER — THERAPY VISIT (OUTPATIENT)
Dept: PHYSICAL THERAPY | Facility: CLINIC | Age: 63
End: 2018-09-27
Payer: COMMERCIAL

## 2018-09-27 DIAGNOSIS — M54.2 NECK PAIN: ICD-10-CM

## 2018-09-27 DIAGNOSIS — M25.511 RIGHT SHOULDER PAIN: ICD-10-CM

## 2018-09-27 PROCEDURE — 97112 NEUROMUSCULAR REEDUCATION: CPT | Mod: GP | Performed by: PHYSICAL THERAPIST

## 2018-09-27 PROCEDURE — 97110 THERAPEUTIC EXERCISES: CPT | Mod: GP | Performed by: PHYSICAL THERAPIST

## 2018-09-27 NOTE — MR AVS SNAPSHOT
After Visit Summary   9/27/2018    Kristine Alvarado    MRN: [id number]           Patient Information     Date Of Birth          1955        Visit Information        Provider Department      9/27/2018 10:20 AM Amelia Robles PT [facility] [facility]        Today's Diagnoses     Right shoulder pain        Neck pain           Follow-ups after your visit        Your next 10 appointments already scheduled     Oct 04, 2018 10:20 AM CDT   PATT Extremity with Amelia Robles PT   [facility] ([facility])    [address]  [address]  [facility] [address] [phone]              Who to contact     If you have questions or need follow up information about today's clinic visit or your schedule please contact [place] [facility] directly at [phone].  Normal or non-critical lab and imaging results will be communicated to you by [employer], letter or phone within 4 business days after the clinic has received the results. If you do not hear from us within 7 days, please contact the clinic through [employer] or phone. If you have a critical or abnormal lab result, we will notify you by phone as soon as possible.  Submit refill requests through [employer] or call your pharmacy and they will forward the refill request to us. Please allow 3 business days for your refill to be completed.          Additional Information About Your Visit        MyChart Information     [employer] gives you secure access to your electronic health record. If you see a primary care provider, you can also send messages to your care team and make appointments. If you have questions, please call your primary care clinic.  If you do not have a primary care provider, please call [phone] and they will assist you.        Care EveryWhere ID     This is your Care EveryWhere ID.  This could be used by other organizations to access your [place] medical records  [id number]         Blood Pressure from Last 3 Encounters:   07/31/18 102/69   07/10/17 110/66   01/17/13 109/77    Weight from Last 3 Encounters:   07/31/18 63.5 kg (140 lb)   07/10/17 60.3 kg (133 lb)   06/19/17 60.3 kg (133 lb)              We Performed the Following     NEUROMUSCULAR RE-EDUCATION     THERAPEUTIC EXERCISES        Primary Care Provider Office Phone # Fax #    Mima Buckner PA-C [phone]       [facility] [facility] [address]  [place] [address]        Equal Access to Services     SID BHATT : Hadii aad ku hadasho Soomaali, waaxda luqadaha, qaybta kaalmada adeegyada, waxay layne toro . So [facility] [phone].    ATENCIÓN: Si habla español, tiene a albarran disposición servicios gratuitos de asistencia lingüística. Ll[facility] [phone].    We comply with applicable federal civil rights laws and [place] laws. We do not discriminate on the basis of race, color, national origin, age, disability, sex, sexual orientation, or gender identity.            Thank you!     Thank you for [facility]  for your care. Our goal is always to provide you with excellent care. Hearing back from our patients is one way we can continue to improve our services. Please take a few minutes to complete the written survey that you may receive in the mail after your visit with us. Thank you!             Your Updated Medication List - Protect others around you: Learn how to safely use, store and throw away your medicines at www.disposemymeds.org.          This list is accurate as of 9/27/18 12:08 PM.  Always use your most recent med list.                   Brand Name Dispense Instructions for use Diagnosis    COMBIPATCH 0.05-0.25 MG/DAY BIW patch   Generic drug:  estradiol-norethindrone      APPLY 1 PATCH TO ABDOMEN TWICE WEEKLY         EXCEDRIN MIGRAINE PO      Take  by mouth.        PROBIOTIC ACIDOPHILUS PO           TYLENOL PO      Take  by mouth.

## 2019-06-26 PROBLEM — M54.2 NECK PAIN: Status: RESOLVED | Noted: 2018-08-02 | Resolved: 2019-06-26

## 2019-06-26 PROBLEM — M25.511 RIGHT SHOULDER PAIN: Status: RESOLVED | Noted: 2018-08-02 | Resolved: 2019-06-26

## 2019-06-26 NOTE — PROGRESS NOTES
Subjective:  HPI                    Objective:  System    Physical Exam    General     ROS    Discharge Note    Progress reporting period is from last progress note on   to Sep 27, 2018.    Kristine failed to follow up and current status is unknown.  Please see information below for last relevant information on current status.  Patient seen for 6 visits.    SUBJECTIVE  Subjective changes noted by patient:  Vane states her pain is better this week, her ROM and function is better  .  Current pain level is 3/10.     Previous pain level was  8/10.   Changes in function:  Yes (See Goal flowsheet attached for changes in current functional level)  Adverse reaction to treatment or activity: None    OBJECTIVE  Changes noted in objective findings: Improved shoulder flx, ext, IR ROM- end range pain 2/10, Abd 2/10 at 90 degrees. Improved scapular stabilization demonstrated. Posture good without cues     ASSESSMENT/PLAN  Diagnosis: R RC tear, neck pain and stiffness; ?secondary adhesive capsulitis    Updated problem list and treatment plan:     STG/LTGs have been met or progress has been made towards goals:  Yes, please see goal flowsheet for most current information  Assessment of Progress: current status is unknown.    Last current status: Pt is progressing as expected   Self Management Plans:  HEP  I have re-evaluated this patient and find that the nature, scope, duration and intensity of the therapy is appropriate for the medical condition of the patient.  Kristine continues to require the following intervention to meet STG and LTG's:  HEP.    Recommendations:  Discharge with current home program.  Patient to follow up with MD as needed.    Please refer to the daily flowsheet for treatment today, total treatment time and time spent performing 1:1 timed codes.

## 2019-10-03 ENCOUNTER — HEALTH MAINTENANCE LETTER (OUTPATIENT)
Age: 64
End: 2019-10-03

## 2020-02-08 ENCOUNTER — HEALTH MAINTENANCE LETTER (OUTPATIENT)
Age: 65
End: 2020-02-08

## 2020-11-07 ENCOUNTER — HEALTH MAINTENANCE LETTER (OUTPATIENT)
Age: 65
End: 2020-11-07

## 2021-03-21 ENCOUNTER — HEALTH MAINTENANCE LETTER (OUTPATIENT)
Age: 66
End: 2021-03-21

## 2021-09-05 ENCOUNTER — HEALTH MAINTENANCE LETTER (OUTPATIENT)
Age: 66
End: 2021-09-05

## 2021-09-09 ENCOUNTER — TRANSFERRED RECORDS (OUTPATIENT)
Dept: HEALTH INFORMATION MANAGEMENT | Facility: CLINIC | Age: 66
End: 2021-09-09

## 2021-09-15 ENCOUNTER — TRANSFERRED RECORDS (OUTPATIENT)
Dept: HEALTH INFORMATION MANAGEMENT | Facility: CLINIC | Age: 66
End: 2021-09-15

## 2021-09-16 ENCOUNTER — TRANSFERRED RECORDS (OUTPATIENT)
Dept: HEALTH INFORMATION MANAGEMENT | Facility: CLINIC | Age: 66
End: 2021-09-16

## 2021-09-20 ENCOUNTER — TRANSFERRED RECORDS (OUTPATIENT)
Dept: HEALTH INFORMATION MANAGEMENT | Facility: CLINIC | Age: 66
End: 2021-09-20

## 2021-09-21 ENCOUNTER — TELEPHONE (OUTPATIENT)
Dept: MAMMOGRAPHY | Facility: CLINIC | Age: 66
End: 2021-09-21

## 2021-09-21 DIAGNOSIS — Z17.0 MALIGNANT NEOPLASM OF UPPER-OUTER QUADRANT OF LEFT BREAST IN FEMALE, ESTROGEN RECEPTOR POSITIVE (H): Primary | ICD-10-CM

## 2021-09-21 DIAGNOSIS — C50.412 MALIGNANT NEOPLASM OF UPPER-OUTER QUADRANT OF LEFT BREAST IN FEMALE, ESTROGEN RECEPTOR POSITIVE (H): Primary | ICD-10-CM

## 2021-09-21 NOTE — TELEPHONE ENCOUNTER
I called patient this afternoon who is scheduled for surgical consultation with Dr. Nydia Hannah on 9/24/21 @ 10:30a.m. at the [facility] [place] [facility] for newly diagnosed Left Breast ILC.      Patient has had her imaging and pathology/biopsy done at [facility].     We discussed her new diagnosis, her upcoming surgical consultation and I answered all of her questions completely to her satisfaction.    Informed patient I am waiting on more imaging and pathology reports and if I get that back I can call her to discuss the pathology results in more detail.      Also informed patient I will check with Dr. Hannah to see if she would like to order a bilateral breast MRI on this patient, and proceed with getting her scheduled.   I will call patient back on this as well.     Patient verbalized understanding and agrees with the plan of care.    Veena Perkins RN BSN  Breast Nurse Care Coordinator  [facility] [place] [facility] [place]  [phone]

## 2021-09-21 NOTE — TELEPHONE ENCOUNTER
Full pathology report received from [facility].  I called patient to explain pathology results.    Also informed Dr. Hannah would like to proceed with getting patient scheduled for a bilateral breast MRI.  I placed order and awaiting Dr. Hannah's signature and scheduling.  Informed patient to call me back with any questions or concerns.      Patient verbalized understanding and agrees with the plan of care.  Veena Perkins RN, BSN

## 2021-09-22 NOTE — TELEPHONE ENCOUNTER
Patient is now scheduled for bilateral breast MRI on 10/5/21 @ 7:30a.m. at the [facility].  Patient was given appointment details and directions from breast imaging scheduler.  Veena Perkins RN, BSN

## 2021-09-23 NOTE — PROGRESS NOTES
[facility] Surgery [facility] Consultation    CHIEF COMPLAINT:  Chief Complaint   Patient presents with     Consult     Left breast [facility]       HISTORY OF PRESENT ILLNESS:  Kristine Alvarado is a 66 year old female who is seen in consultation at the request of Dr. Noriega for evaluation of left breast invasive lobular carcinoma.  The patient was undergoing her annual screening mammogram in September 2021 when she was noted to have asymmetry and possible architectural distortion of the left upper breast, 7 cm from the nipple.  Spot compression views and left breast ultrasound were obtained.  The studies demonstrated 7 x 6 x 5 mm irregular hypoechoic mass at the 1 o'clock position of the left breast, 7 cm from the nipple.  The imaging results did not comment on the patient's left axilla.  The patient underwent ultrasound-guided biopsy and pathology demonstrated invasive lobular carcinoma, grade 2 of 3, associated LCIS but no evidence of angiolymphatic invasion.  ER/MO positive and HER-2 negative.  The patient does undergo annual screening mammograms.  Her breast density is documented as scattered fibroglandular.  The patient has not noticed any breast masses, breast pain, or nipple discharge bilaterally.  This was the patient's first breast biopsy.  Family history is significant for a sister who was diagnosed with ductal breast cancer at the age of 62.  The patient believes that her sister underwent genetic testing and that it was negative.  The patient also has a cousin with a history of breast cancer.  She is currently smoking and admits to approximately 5 cigarettes/day.  The patient has also been taking hormone replacement therapy but states that she took her patch off after she received her breast cancer diagnosis.  Patient began menarche around the age of 13.  She had her first child at age 33 and attempted to breast-feed but was unsuccessful.  She reports being on oral contraceptives for approximately 10 years.   Current bra size is 36D.      REVIEW OF SYSTEMS:  Constitutional: No fevers or chills  Eyes: No blurred or double vision  HENT: Denies headaches, No rhinorrhea, No sore throat  Respiratory: No cough or shortness of breath  Cardiovascular: Denies chest pain or palpitations  Gastrointestinal: No abdominal pain or nausea/vomiting  Genitourinary: No hematuria or dysuria  Musculoskeletal: Denies arthralgias or myalgias  Neurologic: No numbness or tingling  Integumentary: No skin rashes    Past Medical History:   Diagnosis Date     Breast mass WEEK AGO    LEFT      Complication of anesthesia     nausea     Migraine      Mumps     as a child     Need for prophylactic hormone replacement therapy (postmenopausal) STARTED 4 YRS AGO     Spider veins        Past Surgical History:   Procedure Laterality Date     COLONOSCOPY      hx colonic polyps     COLONOSCOPY  1/17/2013    Procedure: COLONOSCOPY;  COLONOSCOPY;  Surgeon: Segun Boyce MD;  Location:  GI     COLONOSCOPY N/A 7/31/2018    Procedure: COLONOSCOPY;  COLONOSCOPY;  Surgeon: Segun Boyce MD;  Location:  GI     CYSTOSCOPY  2011     TONSILLECTOMY         Family History   Problem Relation Age of Onset     Cancer Mother         questionable stomach?     Cancer Father         brain cancer     Breast Cancer Sister        Social History     Tobacco Use     Smoking status: Current Every Day Smoker     Packs/day: 0.20     Types: Cigarettes     Smokeless tobacco: Never Used   Substance Use Topics     Alcohol use: Yes     Comment: 1/month       Patient Active Problem List   Diagnosis   (none) - all problems resolved or deleted       Allergies   Allergen Reactions     Amoxicillin      Sulfa Drugs Rash       Current Outpatient Medications   Medication Sig Dispense Refill     Acetaminophen (TYLENOL PO) Take  by mouth.       Aspirin-Acetaminophen-Caffeine (EXCEDRIN MIGRAINE PO) Take  by mouth.       COMBIPATCH 0.05-0.25 MG/DAY BIW patch APPLY 1 PATCH TO  "ABDOMEN TWICE WEEKLY  4       Vitals: /60 (BP Location: Left arm, Patient Position: Sitting, Cuff Size: Adult Regular)   Pulse 84   Ht 1.676 m (5' 6\")   Wt 64.9 kg (143 lb)   SpO2 97%   BMI 23.08 kg/m    BMI= Body mass index is 23.08 kg/m .    EXAM:  General: Vital signs reviewed, in no apparent distress  Eyes: Anicteric  HENT: Normocephalic, atraumatic, trachea midline   Respiratory: Breathing nonlabored  Cardiovascular: Regular rate and rhythm  Breast: Ecchymoses over left lateral breast but no evidence of palpable breast masses bilaterally  Lymph: No axillary lymphadenopathy bilaterally  Musculoskeletal: No gross deformities  Neurologic: Grossly nonfocal exam  Psychiatric: Normal mood, affect and insight  Integumentary: Warm and dry    All labs and imaging personally reviewed and significant for:   -See HPI    ASSESSMENT:  Kristine Alvarado is a 66 year old who presents with left breast invasive lobular carcinoma.  Significant pertinent co-morbidities include: None.       PLAN:  A thorough discussion was had today in clinic with the patient and her  regarding her recent breast cancer diagnosis and surgical treatment options.  I recommend proceeding with bilateral breast MRI, due to the patient's diagnosis of lobular carcinoma.  The patient reports that she has an MRI previously scheduled for Tuesday of this coming week.  I will have our breast care navigator reach out to her to obtain the results of this study.  Today in clinic, we did discuss both lumpectomy and mastectomy (unilateral versus bilateral).  The patient understands that she would need to undergo sentinel lymph node biopsy as well.  I also discussed possible genetic consultation, due to the patient's family history.  I have also encouraged the patient to completely discontinue her hormone replacement therapy.  The patient is going to consider her surgical options and further management plan will be determined after the results of the " patient's breast MRI are available.    It was my pleasure to participate in the care of Kristine Alvarado in clinic today. Thank you for this consultation.         Nydia Hannah MD    Please route or send letter to:  Primary Care Provider (PCP) and Referring Provider

## 2021-09-24 ENCOUNTER — OFFICE VISIT (OUTPATIENT)
Dept: SURGERY | Facility: CLINIC | Age: 66
End: 2021-09-24
Payer: MEDICARE

## 2021-09-24 VITALS
BODY MASS INDEX: 22.98 KG/M2 | HEART RATE: 84 BPM | OXYGEN SATURATION: 97 % | DIASTOLIC BLOOD PRESSURE: 60 MMHG | HEIGHT: 66 IN | SYSTOLIC BLOOD PRESSURE: 104 MMHG | WEIGHT: 143 LBS

## 2021-09-24 DIAGNOSIS — C50.912 INVASIVE LOBULAR CARCINOMA OF LEFT BREAST IN FEMALE (H): Primary | ICD-10-CM

## 2021-09-24 PROCEDURE — 99204 OFFICE O/P NEW MOD 45 MIN: CPT | Performed by: SURGERY

## 2021-09-24 ASSESSMENT — MIFFLIN-ST. JEOR: SCORE: 1210.39

## 2021-09-24 NOTE — NURSING NOTE
Breast Patients    BREAST PATIENTS (ALL)    1-Do you have any of the following symptoms? Other: none  2-In which breast are you having the symptoms? left  3-Have you had a Mammogram? 9/8-9/2021  4-Have you ever had a breast cyst drained? No  5-Have you ever had a breast biopsy? Yes:  Left   -   Date:  9/9/2021  6-Have you ever had a Breast Cancer? No   7-Is there a history of Breast Cancer in your family? Yes   Relationship to you:    Sister  Cousin  8-Have you ever had Ovarian Cancer? No  9-Is there a history of Ovarian Cancer in your family? No  10-Summarize your caffeine intake (i.e. coffee, tea, chocolate, soda etc.): 2-3 a day of coffee    BREAST PATIENTS (FEMALE)    11-What age did your periods begin? Around 13  12-Date your last menstrual period began? 2008?  13-Number of full-term pregnancies: 2  14-Your age when your first child was born? 33  15-Did you nurse your children? No  16-Are you pregnant now? No  17-Have you begun menopause? Yes  Age Menopause began:  53  18-Have you had either ovary removed?No  19-Do you have breast implants? No   20-Do you use hormone replacement therapy?  Yes  Type: Combi patch  Dosage: 0.05-0.25 once a week  21-Have you taken oral contraceptive pills?    22-Have you had an intrauterine device (IUD) placed?  No  23-What is your current bra size?  36D

## 2021-09-24 NOTE — Clinical Note
Hey there!  Turns out, the patient has an MRI scheduled at [facility] on Tuesday.  She also has a consultation scheduled at [facility] next week but tells me that she is uncertain whether she plans to keep that appointment.  Can you please reach out to her on either Wednesday or Thursday of next week to obtain her MRI results?  We did also discuss the possibility of the patient doing her work-up here in [place] but then continuing with treatment in [place].  Just a heads up!  Thanks,  Nydia

## 2021-09-24 NOTE — LETTER
September 24, 2021          Monica Noriega MD  [facility]  [address] [facility] [address]  [place] [address]      RE:   Kristine Alvarado 1955      Dear Colleague,    Thank you for referring your patient, rKistine Alvarado, to [facility]. Please see a copy of my visit note below.    [facility] Consultation     CHIEF COMPLAINT:       Chief Complaint   Patient presents with     Consult       Left breast [facility]         HISTORY OF PRESENT ILLNESS:  Kristine Alvarado is a 66 year old female who is seen in consultation at the request of Dr. Noriega for evaluation of left breast invasive lobular carcinoma.  The patient was undergoing her annual screening mammogram in September 2021 when she was noted to have asymmetry and possible architectural distortion of the left upper breast, 7 cm from the nipple.  Spot compression views and left breast ultrasound were obtained.  The studies demonstrated 7 x 6 x 5 mm irregular hypoechoic mass at the 1 o'clock position of the left breast, 7 cm from the nipple.  The imaging results did not comment on the patient's left axilla.  The patient underwent ultrasound-guided biopsy and pathology demonstrated invasive lobular carcinoma, grade 2 of 3, associated LCIS but no evidence of angiolymphatic invasion.  ER/VA positive and HER-2 negative.  The patient does undergo annual screening mammograms.  Her breast density is documented as scattered fibroglandular.  The patient has not noticed any breast masses, breast pain, or nipple discharge bilaterally.  This was the patient's first breast biopsy.  Family history is significant for a sister who was diagnosed with ductal breast cancer at the age of 62.  The patient believes that her sister underwent genetic testing and that it was negative.  The patient also has a cousin with a history of breast cancer.  She is currently smoking and admits to approximately 5 cigarettes/day.  The patient has also been  taking hormone replacement therapy but states that she took her patch off after she received her breast cancer diagnosis.  Patient began menarche around the age of 13.  She had her first child at age 33 and attempted to breast-feed but was unsuccessful.  She reports being on oral contraceptives for approximately 10 years.  Current bra size is 36D.       REVIEW OF SYSTEMS:  Constitutional: No fevers or chills  Eyes: No blurred or double vision  HENT: Denies headaches, No rhinorrhea, No sore throat  Respiratory: No cough or shortness of breath  Cardiovascular: Denies chest pain or palpitations  Gastrointestinal: No abdominal pain or nausea/vomiting  Genitourinary: No hematuria or dysuria  Musculoskeletal: Denies arthralgias or myalgias  Neurologic: No numbness or tingling  Integumentary: No skin rashes     Past Medical History        Past Medical History:   Diagnosis Date     Breast mass WEEK AGO     LEFT      Complication of anesthesia       nausea     Migraine       Mumps       as a child     Need for prophylactic hormone replacement therapy (postmenopausal) STARTED 4 YRS AGO     Spider veins              Past Surgical History         Past Surgical History:   Procedure Laterality Date     COLONOSCOPY         hx colonic polyps     COLONOSCOPY   1/17/2013     Procedure: COLONOSCOPY;  COLONOSCOPY;  Surgeon: Segun Boyce MD;  Location:  GI     COLONOSCOPY N/A 7/31/2018     Procedure: COLONOSCOPY;  COLONOSCOPY;  Surgeon: Segun Boyce MD;  Location:  GI     CYSTOSCOPY   2011     TONSILLECTOMY                Family History         Family History   Problem Relation Age of Onset     Cancer Mother           questionable stomach?     Cancer Father           brain cancer     Breast Cancer Sister              Social History            Tobacco Use     Smoking status: Current Every Day Smoker       Packs/day: 0.20       Types: Cigarettes     Smokeless tobacco: Never Used   Substance Use Topics     Alcohol  "use: Yes       Comment: 1/month         Patient Active Problem List   Diagnosis   (none) - all problems resolved or deleted              Allergies   Allergen Reactions     Amoxicillin       Sulfa Drugs Rash         Current Outpatient Prescriptions          Current Outpatient Medications   Medication Sig Dispense Refill     Acetaminophen (TYLENOL PO) Take  by mouth.         Aspirin-Acetaminophen-Caffeine (EXCEDRIN MIGRAINE PO) Take  by mouth.         COMBIPATCH 0.05-0.25 MG/DAY BIW patch APPLY 1 PATCH TO ABDOMEN TWICE WEEKLY   4            Vitals: /60 (BP Location: Left arm, Patient Position: Sitting, Cuff Size: Adult Regular)   Pulse 84   Ht 1.676 m (5' 6\")   Wt 64.9 kg (143 lb)   SpO2 97%   BMI 23.08 kg/m    BMI= Body mass index is 23.08 kg/m .     EXAM:  General: Vital signs reviewed, in no apparent distress  Eyes: Anicteric  HENT: Normocephalic, atraumatic, trachea midline   Respiratory: Breathing nonlabored  Cardiovascular: Regular rate and rhythm  Breast: Ecchymoses over left lateral breast but no evidence of palpable breast masses bilaterally  Lymph: No axillary lymphadenopathy bilaterally  Musculoskeletal: No gross deformities  Neurologic: Grossly nonfocal exam  Psychiatric: Normal mood, affect and insight  Integumentary: Warm and dry     All labs and imaging personally reviewed and significant for:   -See HPI     ASSESSMENT:  Kristine Alvarado is a 66 year old who presents with left breast invasive lobular carcinoma.  Significant pertinent co-morbidities include: None.         PLAN:  A thorough discussion was had today in clinic with the patient and her  regarding her recent breast cancer diagnosis and surgical treatment options.  I recommend proceeding with bilateral breast MRI, due to the patient's diagnosis of lobular carcinoma.  The patient reports that she has an MRI previously scheduled for Tuesday of this coming week.  I will have our breast care navigator reach out to her to obtain " the results of this study.  Today in clinic, we did discuss both lumpectomy and mastectomy (unilateral versus bilateral).  The patient understands that she would need to undergo sentinel lymph node biopsy as well.  I also discussed possible genetic consultation, due to the patient's family history.  I have also encouraged the patient to completely discontinue her hormone replacement therapy.  The patient is going to consider her surgical options and further management plan will be determined after the results of the patient's breast MRI are available.           Again, thank you for allowing me to participate in the care of your patient.      Sincerely,      Nydia Hannah MD

## 2021-09-28 ENCOUNTER — TELEPHONE (OUTPATIENT)
Dept: MAMMOGRAPHY | Facility: CLINIC | Age: 66
End: 2021-09-28

## 2021-09-28 NOTE — TELEPHONE ENCOUNTER
Patient called me back this afternoon to inform she saw a surgeon at [facility] yesterday and is scheduled for a breast MRI today at [facility].      Patient states she will ask for [facility] to push her MRI images and reports to us.  She also states she does not need the MRI scheduled at [facility] on 10/5/21, and is okay if I go ahead and cancel it.    Vane plans on waiting until she gets the MRI results before she decides on which breast surgeon she would like to go with, and she will call me to inform.  Informed patient to call us back with any questions or concerns.  Patient verbalizes understanding and agrees with the plan of care.  Veena Perkins RN BSN  Breast Nurse Care Coordinator  [facility] [place] [place] [facility] [place]  [phone]

## 2021-09-28 NOTE — TELEPHONE ENCOUNTER
Breast Care Nurse Coordination:  Follow up call placed to patient today to assess her needs and check in as to whether she has any questions regarding her recent surgical consultation.  I called patient this morning and left a message asking if she could return my call when available.     Vane was recently diagnosed with Left breast cancer and had a surgical consultation with Dr. Nydia Hannah on 9/24/21.    Patient is scheduled for bilateral breast MRI on 10/5/21 @ 7:30a.m., at the [facility].    It was discussed at patient's visit with Dr. Hannah on 9/24/21 that she is also scheduled for consult with surgeon at [facility] this week as well as a breast MRI 9/28/21.  I am reaching out to patient to confirm she had MRI today, and then cancel MRI at [facility] on 10/5/21.   Awaiting a return call from patient.    Veena Perkins RN BSN  Breast Care Nurse Coordinator  [facility] [place]  [facility] [place]  [phone]

## 2021-09-30 NOTE — TELEPHONE ENCOUNTER
I called patient this afternoon and left her a voicemail message asking if she could return my call when available.    I am reaching out to patient to check in on how her Breast MRI went at [facility] earlier this week and if she has decided on which breast surgeon she would like to use.   Awaiting a return call from patient to discuss.      I will ask Anurag MANDEL - Imaging Data  if he could obtain images and Breast MRI report from [facility].  Veena Perkins, RN, BSN

## 2021-09-30 NOTE — TELEPHONE ENCOUNTER
Patient called back and left me a voicemail message stating she was notified of her breast MRI results this past Tuesday.    I spoke with Vane and she states the Breast MRI showed 2 spots on the right breast which require biopsies and she is now scheduled at [facility] on Monday, Oct. 4th.  Patient states she has not decided yet if she wants to seek her breast cancer treatment at [facility] or [facility].    I have requested for the films and reports to be sent to us for Dr. Hannah to review.  Informed patient to call me back with any questions or concerns, otherwise I will follow up with her next week.  Patient verbalized understanding and agrees with the plan of care.  Veena Perkins, RN, BSN

## 2021-10-12 NOTE — TELEPHONE ENCOUNTER
I called patient and left her a message asking if she could return my call when available.  Awaiting a return call with an update on patient's status.  Veena Perkins, RN, BSN

## 2021-10-13 NOTE — TELEPHONE ENCOUNTER
Patient called me back on 10/12/2021 @ 3:48p.m. and left me a voicemail message stating she is scheduled to have a bilateral mastectomy at [facility] [place] with Dr. Chayo Rodriguez on 10/27/21 and will move forward with having her breast cancer treatment at [facility].      Patient expressed gratitude for the care Dr. Hannah had given her at [place].  I will forward message on to Dr. Hannah to inform.  Veena Perkins RN, BSN

## 2021-10-19 ENCOUNTER — TRANSFERRED RECORDS (OUTPATIENT)
Dept: HEALTH INFORMATION MANAGEMENT | Facility: CLINIC | Age: 66
End: 2021-10-19
Payer: MEDICARE

## 2021-10-19 ENCOUNTER — MEDICAL CORRESPONDENCE (OUTPATIENT)
Dept: HEALTH INFORMATION MANAGEMENT | Facility: CLINIC | Age: 66
End: 2021-10-19
Payer: MEDICARE

## 2021-10-19 DIAGNOSIS — I45.10 RIGHT BUNDLE BRANCH BLOCK: Primary | ICD-10-CM

## 2021-10-21 ENCOUNTER — HOSPITAL ENCOUNTER (OUTPATIENT)
Dept: CARDIOLOGY | Facility: CLINIC | Age: 66
Discharge: HOME | End: 2021-10-21
Attending: PHYSICIAN ASSISTANT | Admitting: PHYSICIAN ASSISTANT
Payer: MEDICARE

## 2021-10-21 DIAGNOSIS — I45.10 RIGHT BUNDLE BRANCH BLOCK: ICD-10-CM

## 2021-10-21 PROCEDURE — 93016 CV STRESS TEST SUPVJ ONLY: CPT | Performed by: INTERNAL MEDICINE

## 2021-10-21 PROCEDURE — 93350 STRESS TTE ONLY: CPT | Mod: 26 | Performed by: INTERNAL MEDICINE

## 2021-10-21 PROCEDURE — 93018 CV STRESS TEST I&R ONLY: CPT | Performed by: INTERNAL MEDICINE

## 2021-10-21 PROCEDURE — 999N000208 ECHO STRESS ECHOCARDIOGRAM

## 2021-10-21 PROCEDURE — 93321 DOPPLER ECHO F-UP/LMTD STD: CPT | Mod: 26 | Performed by: INTERNAL MEDICINE

## 2021-10-21 PROCEDURE — 93325 DOPPLER ECHO COLOR FLOW MAPG: CPT | Mod: 26 | Performed by: INTERNAL MEDICINE

## 2021-10-21 PROCEDURE — C8928 TTE W OR W/O FOL W/CON,STRES: HCPCS

## 2022-04-17 ENCOUNTER — HEALTH MAINTENANCE LETTER (OUTPATIENT)
Age: 67
End: 2022-04-17

## 2022-08-22 ENCOUNTER — HOSPITAL ENCOUNTER (OUTPATIENT)
Dept: CT IMAGING | Facility: CLINIC | Age: 67
Discharge: HOME | End: 2022-08-22
Attending: PHYSICIAN ASSISTANT | Admitting: PHYSICIAN ASSISTANT
Payer: MEDICARE

## 2022-08-22 DIAGNOSIS — Z87.891 HISTORY OF TOBACCO USE: ICD-10-CM

## 2022-08-22 PROCEDURE — 71271 CT THORAX LUNG CANCER SCR C-: CPT

## 2022-10-23 ENCOUNTER — HEALTH MAINTENANCE LETTER (OUTPATIENT)
Age: 67
End: 2022-10-23

## 2023-06-01 ENCOUNTER — HEALTH MAINTENANCE LETTER (OUTPATIENT)
Age: 68
End: 2023-06-01

## 2023-09-07 ENCOUNTER — ANCILLARY PROCEDURE (OUTPATIENT)
Dept: CT IMAGING | Facility: CLINIC | Age: 68
End: 2023-09-07
Attending: PHYSICIAN ASSISTANT
Payer: MEDICARE

## 2023-09-07 DIAGNOSIS — Z87.891 HISTORY OF TOBACCO USE: ICD-10-CM

## 2023-09-07 PROCEDURE — 71271 CT THORAX LUNG CANCER SCR C-: CPT

## 2024-01-14 ENCOUNTER — HEALTH MAINTENANCE LETTER (OUTPATIENT)
Age: 69
End: 2024-01-14

## 2024-06-02 ENCOUNTER — HEALTH MAINTENANCE LETTER (OUTPATIENT)
Age: 69
End: 2024-06-02

## 2024-09-18 ENCOUNTER — ANCILLARY PROCEDURE (OUTPATIENT)
Dept: CT IMAGING | Facility: CLINIC | Age: 69
End: 2024-09-18
Attending: PHYSICIAN ASSISTANT
Payer: MEDICARE

## 2024-09-18 DIAGNOSIS — Z87.891 HISTORY OF TOBACCO USE: ICD-10-CM

## 2024-09-18 PROCEDURE — 71271 CT THORAX LUNG CANCER SCR C-: CPT

## 2025-06-15 ENCOUNTER — HEALTH MAINTENANCE LETTER (OUTPATIENT)
Age: 70
End: 2025-06-15

## 2025-08-04 ENCOUNTER — HOSPITAL ENCOUNTER (OUTPATIENT)
Dept: PET IMAGING | Facility: CLINIC | Age: 70
Setting detail: NUCLEAR MEDICINE
Discharge: HOME | End: 2025-08-04
Attending: INTERNAL MEDICINE | Admitting: INTERNAL MEDICINE
Payer: MEDICARE

## 2025-08-04 DIAGNOSIS — C50.412 MALIGNANT NEOPLASM OF UPPER-OUTER QUADRANT OF LEFT BREAST IN FEMALE, ESTROGEN RECEPTOR NEGATIVE (H): ICD-10-CM

## 2025-08-04 DIAGNOSIS — Z17.1 MALIGNANT NEOPLASM OF UPPER-OUTER QUADRANT OF LEFT BREAST IN FEMALE, ESTROGEN RECEPTOR NEGATIVE (H): ICD-10-CM

## 2025-08-04 PROCEDURE — A9591 FLUOROESTRADIOL F 18: HCPCS

## 2025-08-04 PROCEDURE — 78815 PET IMAGE W/CT SKULL-THIGH: CPT | Mod: PI

## 2025-08-04 PROCEDURE — 343N000001 HC RX 343 MED OP 636

## 2025-08-04 RX ADMIN — FLUOROESTRADIOL F 18 6.46 MILLICURIE: 100 INJECTION INTRAVENOUS at 13:14

## (undated) RX ORDER — FENTANYL CITRATE 50 UG/ML
INJECTION, SOLUTION INTRAMUSCULAR; INTRAVENOUS
Status: DISPENSED
Start: 2018-07-31